# Patient Record
Sex: MALE | Race: WHITE | NOT HISPANIC OR LATINO | Employment: OTHER | RURAL
[De-identification: names, ages, dates, MRNs, and addresses within clinical notes are randomized per-mention and may not be internally consistent; named-entity substitution may affect disease eponyms.]

---

## 2021-08-24 ENCOUNTER — TRANSCRIBE ORDERS (OUTPATIENT)
Dept: PHYSICAL THERAPY | Facility: CLINIC | Age: 81
End: 2021-08-24

## 2021-08-24 DIAGNOSIS — Z96.651 PRESENCE OF RIGHT ARTIFICIAL KNEE JOINT: ICD-10-CM

## 2021-08-24 DIAGNOSIS — M17.11 UNILATERAL PRIMARY OSTEOARTHRITIS, RIGHT KNEE: Primary | ICD-10-CM

## 2021-08-26 ENCOUNTER — TREATMENT (OUTPATIENT)
Dept: PHYSICAL THERAPY | Facility: CLINIC | Age: 81
End: 2021-08-26

## 2021-08-26 DIAGNOSIS — M25.561 CHRONIC PAIN OF RIGHT KNEE: ICD-10-CM

## 2021-08-26 DIAGNOSIS — Z96.651 STATUS POST TOTAL RIGHT KNEE REPLACEMENT: Primary | ICD-10-CM

## 2021-08-26 DIAGNOSIS — G89.29 CHRONIC PAIN OF RIGHT KNEE: ICD-10-CM

## 2021-08-26 PROCEDURE — 97162 PT EVAL MOD COMPLEX 30 MIN: CPT | Performed by: PHYSICAL THERAPIST

## 2021-08-26 PROCEDURE — 97110 THERAPEUTIC EXERCISES: CPT | Performed by: PHYSICAL THERAPIST

## 2021-08-26 NOTE — PROGRESS NOTES
Physical Therapy Initial Evaluation and Plan of Care      Patient: Munir Lemons   : 1940  Diagnosis/ICD-10 Code:  Status post total right knee replacement [Z96.651]  Referring practitioner: Shaw Trivedi *  Date of Initial Visit: 2021  Today's Date: 2021  Patient seen for 1 sessions    Recertification: 2021   Next MD appt: 2022.    PACEMAKER         Subjective Questionnaire: LEFS: 68/80      Subjective Evaluation    History of Present Illness  Date of surgery: 2021    Subjective comment: Patient reports he had his R TKA replaced in may and he reports he decided to go to a different PT place than when he had his L knee replaced. He reports that  his R knee is still not like it should be. he reprots he wished he had gone here too.   Patient Occupation: Retired. Pain  Current pain rating: 3  At best pain ratin  At worst pain ratin  Location: R knee  Quality: tight  Relieving factors: change in position  Aggravating factors: squatting, prolonged positioning, repetitive movement, movement, standing and sleeping    Social Support  Lives in: one-story house (3 steps in to the home)  Lives with: spouse    Diagnostic Tests  X-ray: normal    Treatments  Previous treatment: physical therapy  Patient Goals  Patient goals for therapy: decreased pain and increased motion             Objective          Static Posture     Knee   Knee (Right): Flexed.     Palpation     Additional Palpation Details  R IT band distally tender and tight.    Tenderness     Right Knee   Tenderness in the ITB.     Neurological Testing     Sensation     Knee   Left Knee   Intact: light touch    Right Knee   Intact: light touch     Active Range of Motion   Left Knee   Flexion: 120 degrees   Extension: 0 degrees     Right Knee   Flexion: 112 degrees   Extension: 3 degrees     Patellar Mobility   Left Knee Patellar tendons within functional limits include the medial, lateral, superior and inferior.      Right Knee Patellar tendons within functional limits include the medial and lateral. Hypomobile in the superior and inferior patellar tendon(s).     Patellar Static Positioning   Left Knee: WFL  Right Knee: WFL    Strength/Myotome Testing     Left Knee   Normal strength  Flexion: 5  Extension: 5  Quadriceps contraction: good    Right Knee   Flexion: 4+  Extension: 4+  Quadriceps contraction: good    Ambulation   Weight-Bearing Status   Weight-Bearing Status (Left): weight-bearing as tolerated   Weight-Bearing Status (Right): weight-bearing as tolerated    Assistive device used: none    Ambulation: Level Surfaces   Ambulation without assistive device: independent    Ambulation: Stairs     Additional Stairs Ambulation Details  Not tested.    Observational Gait   Gait: antalgic and asymmetric   Decreased right step length.   Left foot contact pattern: heel to toe  Right foot contact pattern: foot flat  Left arm swing: within functional limits  Right arm swing: within functional limits  Base of support: normal    Quality of Movement During Gait     Knee    Knee (Right): Positive increased flexion during stance and stiff knee.           Assessment & Plan     Assessment  Impairments: abnormal gait, abnormal or restricted ROM, activity intolerance, impaired balance, impaired physical strength, lacks appropriate home exercise program, pain with function and weight-bearing intolerance  Assessment details: Patient is 3 months post op from R TKA. He had PT for ~2 months at another location in [facility] and is still lacking ROM and some strength. Also has altered gait due to his lack of AROM. Stiffness in patella also. Patient did well with all HEP exercises today. Patient was given written copies of HEP.  Prognosis: good  Prognosis details: Barriers to Rehab: The chronicity of this issue.    Safety Issues: Pacemaker    Functional Limitations: walking, sitting, standing and stooping  Goals  Plan Goals: Short Term Goals:  1)  Patient I with HEP and have additoins/changes by next recertification.    2) AROM R knee flexion >= 117°.    3) AROM R knee extension 0°.    4) Patient able to ambulate with good heel to toe gait cycle >= 300', non-antalgicaly, no increase in pain with terminal knee extension.    5) Patient able to perform sit to/from stand with 1 UE A = WB B LE x20, no increase in pain.      Long Term Goals:  1) AROm of the R knee 0°->= 120°.    2) B LE 5/5.    3) Patient able to ascend/descend 3 steps reciprocally with 1 hand rail assist, no increase in pain x10 reps.    4) Patient able to ambulate with no assistive device non-antalgicaly >= 1/4 mile.    5) Patient to be I with final HEP.          Plan  Therapy options: will be seen for skilled physical therapy services  Planned modality interventions: cryotherapy  Planned therapy interventions: ADL retraining, balance/weight-bearing training, body mechanics training, flexibility, functional ROM exercises, gait training, home exercise program, IADL retraining, joint mobilization, manual therapy, neuromuscular re-education, soft tissue mobilization, strengthening, stretching, therapeutic activities, transfer training and spinal/joint mobilization  Duration in visits: 8  Treatment plan discussed with: patient  Plan details: Progress overall strength, ROM, gait, function, and mobility.      Other therapeutic activities and/or exercises will be prescribed depending on the patients progress or lack there of.     Visit Diagnoses:    ICD-10-CM ICD-9-CM   1. Status post total right knee replacement  Z96.651 V43.65   2. Chronic pain of right knee  M25.561 719.46    G89.29 338.29       Timed:  Manual Therapy:         mins  04988;  Therapeutic Exercise:    26     mins  04558;      Neuromuscular Fausto:        mins  85116;    Therapeutic Activity:          mins  79820;     Gait Training:           mins  07467;     Ultrasound:          mins  84895;    Paraffin:        mins  97111;  Electrical  Stimulation:         mins  78775 ( );    Untimed:  Electrical Stimulation:         mins  47149 ( );  Mechanical Traction:         mins  60776;     Timed Treatment:   26   mins   Total Treatment:     46   mins    PT SIGNATURE: Di Neal PT DPT, CSCS   DATE TREATMENT INITIATED: 8/26/2021    Initial Certification  Certification Period: 11/24/2021  I certify that the therapy services are furnished while this patient is under my care.  The services outlined above are required by this patient, and will be reviewed every 90 days.     PHYSICIAN: Shaw Damian MD      DATE:     Please sign and return via fax to  .. Thank you, [facility].        This document has been electronically signed by Di Neal PT DPT, [facility] on August 26, 2021 13:38 CDT

## 2021-08-31 ENCOUNTER — TREATMENT (OUTPATIENT)
Dept: PHYSICAL THERAPY | Facility: CLINIC | Age: 81
End: 2021-08-31

## 2021-08-31 DIAGNOSIS — Z96.651 STATUS POST TOTAL RIGHT KNEE REPLACEMENT: Primary | ICD-10-CM

## 2021-08-31 DIAGNOSIS — G89.29 CHRONIC PAIN OF RIGHT KNEE: ICD-10-CM

## 2021-08-31 DIAGNOSIS — M25.561 CHRONIC PAIN OF RIGHT KNEE: ICD-10-CM

## 2021-08-31 PROCEDURE — 97140 MANUAL THERAPY 1/> REGIONS: CPT | Performed by: PHYSICAL THERAPIST

## 2021-08-31 PROCEDURE — 97110 THERAPEUTIC EXERCISES: CPT | Performed by: PHYSICAL THERAPIST

## 2021-08-31 NOTE — PROGRESS NOTES
Physical Therapy Daily Progress Note    Patient: Munir Lemons   : 1940  Diagnosis/ICD-10 Code:     Diagnosis Plan   1. Status post total right knee replacement     2. Chronic pain of right knee       Referring practitioner: Shaw Trivedi *  Date of Initial Visit: Type: THERAPY  Noted: 2021  Today's Date: 2021  Patient seen for 2 sessions      PT Recheck Due: 2021  PT MD Visit:     PACEMAKER       Munir Lemons      Subjective Evaluation    History of Present Illness    Subjective comment: states that he is just stiff feeling. gets stiff when he rides the tractor or like today standing on a step stool cleaning out cabinets. Pain  Current pain ratin             Objective          Active Range of Motion     Right Knee   Flexion: 127 degrees   Extension: 5 degrees       See Exercise, Manual, and Modality Logs for complete treatment.       Assessment & Plan     Assessment  Assessment details: Patient tends to long sit in a position of comfort with bilateral LE's slightly bent and rolled out. Patient is educated in avoiding position of comfort especially with surgical side and to make sure that he is sitting in a position of Knee upright and working on stretching into full extension. Patient verbalizes understanding appropriate positioning after education. Patient has significant increase in AROM knee flexion today    Goals  Plan Goals: Short Term Goals:  1) Patient I with HEP and have additoins/changes by next recertification. (met)    2) AROM R knee flexion >= 117°. (met)    3) AROM R knee extension 0°.     4) Patient able to ambulate with good heel to toe gait cycle >= 300', non-antalgicaly, no increase in pain with terminal knee extension.    5) Patient able to perform sit to/from stand with 1 UE A = WB B LE x20, no increase in pain.      Long Term Goals:  1) AROm of the R knee 0°->= 120°.    2) B LE 5/5.    3) Patient able to ascend/descend 3 steps  reciprocally with 1 hand rail assist, no increase in pain x10 reps.    4) Patient able to ambulate with no assistive device non-antalgicaly >= 1/4 mile.    5) Patient to be I with final HEP.    Plan  Plan details: Retro walking in [place]      Progress per Plan of Care and Progress strengthening /stabilization /functional activity            Timed:  Manual Therapy:    10     mins  97686;  Therapeutic Exercise:     50    mins  61632;   Aquatic Therex :        mins  30830    Neuromuscular Fausto:        mins  30078;    Therapeutic Activity:          mins  47718;     Gait Training:           mins  13442;     Ultrasound:          mins  26484;    Electrical Stimulation:         mins  24771 ( );    Untimed:  Electrical Stimulation:         mins  13409 ( );  Mechanical Traction:         mins  85568;   Paraffin:         mins  65140;  Orthotic fit/train:         mins  87859  Iontophoresis:          mins  48885    Timed Treatment:   60   mins   Total Treatment:     60   mins  Lina Rey PTA  Physical Therapist Assistant      This document has been electronically signed by Lina Rey PTA on August 31, 2021 09:52 CDT

## 2021-09-02 ENCOUNTER — HOSPITAL ENCOUNTER (OUTPATIENT)
Dept: PHYSICAL THERAPY | Facility: HOSPITAL | Age: 81
Setting detail: THERAPIES SERIES
Discharge: HOME | End: 2021-09-02

## 2021-09-02 DIAGNOSIS — G89.29 CHRONIC PAIN OF RIGHT KNEE: ICD-10-CM

## 2021-09-02 DIAGNOSIS — M25.561 CHRONIC PAIN OF RIGHT KNEE: ICD-10-CM

## 2021-09-02 DIAGNOSIS — Z96.651 STATUS POST TOTAL RIGHT KNEE REPLACEMENT: Primary | ICD-10-CM

## 2021-09-02 PROCEDURE — 97110 THERAPEUTIC EXERCISES: CPT

## 2021-09-02 PROCEDURE — 97140 MANUAL THERAPY 1/> REGIONS: CPT

## 2021-09-02 NOTE — THERAPY TREATMENT NOTE
Outpatient Physical Therapy Ortho Treatment Note  [facility]     Patient Name: Munir Lemons  : 1940  MRN: [id number]  Today's Date: 2021     This is a transfer from [facility] to [facility].      Visit Date: 2021     Attendance: 3/3  Does not rate % Improvement  MD Visit: TBD  Recheck Date: 2021    Therapy Diagnosis: S/P R TKA      Visit Dx:    ICD-10-CM ICD-9-CM   1. Status post total right knee replacement  Z96.651 V43.65   2. Chronic pain of right knee  M25.561 719.46    G89.29 338.29       There is no problem list on file for this patient.       Past Medical History:   Diagnosis Date   • Arthritis    • Cataract     Removed in    • High blood pressure    • History of coronary artery stent placement    • Malfunction of cardiac pacemaker battery 2021   • Pacemaker    • Stroke (CMS/[facility])     TIA        No past surgical history on file.    PT Ortho     Row Name 21 1100       Subjective Comments    Subjective Comments  states that he didn't hardly sleep last night. reports that he has some tightness today.   -       Precautions and Contraindications    Precautions/Limitations  (S) pacemaker  -       Subjective Pain    Able to rate subjective pain?  yes  -    Pre-Treatment Pain Level  6  -       Right Lower Ext    Rt Knee Extension/Flexion AROM  2-122°  -    Rt Knee Extension/Flexion PROM  Knee extension is 0° passively  -      User Key  (r) = Recorded By, (t) = Taken By, (c) = Cosigned By    Initials Name Provider Type    Lina Melendrez PTA Physical Therapy Assistant                      PT Assessment/Plan     Row Name 21 1100          PT Assessment    Assessment Comments  patient is able to achieve PROM of full knee extension. is 2° from full knee extension actively. gives good effort. patient reports increased pain today.   -        PT Plan    PT Frequency  2x/week  -     PT Plan Comments  continue to progress ROM for full knee  extension  -       User Key  (r) = Recorded By, (t) = Taken By, (c) = Cosigned By    Initials Name Provider Type     Lina Rey PTA Physical Therapy Assistant            OP Exercises     Row Name 09/02/21 1100             Precautions    Existing Precautions/Restrictions  (S) pacemaker  -         Subjective Comments    Subjective Comments  states that he didn't hardly sleep last night. reports that he has some tightness today.   -         Subjective Pain    Able to rate subjective pain?  yes  -      Pre-Treatment Pain Level  6  -      Post-Treatment Pain Level  7  -         Exercise 1    Exercise Name 1  Pro II LE's for ROM, endurance, strength  -      Time 1  10 minutes  -      Additional Comments  L 5.0  -         Exercise 2    Exercise Name 2  B St. HS S  -      Reps 2  2  -      Time 2  30 sec hold  -         Exercise 3    Exercise Name 3  R Lunge S   -MH      Reps 3  10  -MH      Time 3  10 sec hold  -         Exercise 4    Exercise Name 4  B Incline Calf S  -      Reps 4  2  -MH      Time 4  30 sec hold  -         Exercise 5    Exercise Name 5  Step Ups Fwd  -      Reps 5  20  -MH      Additional Comments  6 inch step  -MH         Exercise 6    Exercise Name 6  Step Ups Lat  -MH      Reps 6  20  -MH      Additional Comments  6 inch step  -MH         Exercise 7    Exercise Name 7  Ecc Step Downs  -      Reps 7  20  -MH      Additional Comments  5 inch step  -MH         Exercise 8    Exercise Name 8  Prone Hang  -      Time 8  5 minutes  -      Additional Comments  2# cuff weight  -        User Key  (r) = Recorded By, (t) = Taken By, (c) = Cosigned By    Initials Name Provider Type     Lina Rey PTA Physical Therapy Assistant                      Manual Rx (last 36 hours)      Manual Treatments     Row Name 09/02/21 1300             Manual Rx 1    Manual Rx 1 Location  R Knee  -      Manual Rx 1 Type  Heel Prop with Patella mobes inf/sup  -      Manual Rx  1 Grade  5 minutes  -         Manual Rx 2    Manual Rx 2 Location  R knee  -      Manual Rx 2 Type  Manual Overpressure with STM to hamstrings  -      Manual Rx 2 Grade  5 minutes  -        User Key  (r) = Recorded By, (t) = Taken By, (c) = Cosigned By    Initials Name Provider Type    Lina Melendrez PTA Physical Therapy Assistant          PT OP Goals     Row Name 09/02/21 1100          PT Short Term Goals    STG 1  Patient I with HEP and have additoins/changes by next recertification.  -     STG 1 Progress  Met  -     STG 2  AROM R knee flexion >= 117°  -     STG 2 Progress  Met  -     STG 3  AROM R knee extension 0°.   -     STG 4  Patient able to ambulate with good heel to toe gait cycle >= 300', non-antalgicaly, no increase in pain with terminal knee extension.  -     STG 5  Patient able to perform sit to/from stand with 1 UE A = WB B LE x20, no increase in pain.  -        Long Term Goals    LTG 1  AROm of the R knee 0°->= 120°.  -     LTG 2  B LE 5/5.  -     LTG 3  Patient able to ascend/descend 3 steps reciprocally with 1 hand rail assist, no increase in pain x10 reps.  -     LTG 4  Patient able to ambulate with no assistive device non-antalgicaly >= 1/4 mile.  -     LTG 5  Patient to be I with final HEP.  -        Time Calculation    PT Goal Re-Cert Due Date  09/16/21  -       User Key  (r) = Recorded By, (t) = Taken By, (c) = Cosigned By    Initials Name Provider Type     Lina Rey PTA Physical Therapy Assistant          Therapy Education  Given: HEP  Program: Reinforced  How Provided: Verbal, Demonstration  Provided to: Patient  Level of Understanding: Verbalized, Demonstrated              Time Calculation:   Start Time: 1133  Stop Time: 1217  Time Calculation (min): 44 min  Total Timed Code Minutes- PT: 44 minute(s)  Therapy Charges for Today     Code Description Service Date Service Provider Modifiers Qty    19446796735 HC PT THER PROC EA 15 MIN 9/2/2021  Lina Rey PTA GP 2    30345806263 HC PT THER SUPP EA 15 MIN 9/2/2021 Lina Rey PTA GP 1    28504945179 HC PT MANUAL THERAPY EA 15 MIN 9/2/2021 Lina Rey PTA GP 1                    Lina Rey PTA  9/2/2021            This document has been electronically signed by Lina Rey PTA on September 2, 2021 13:13 CDT

## 2021-09-07 ENCOUNTER — HOSPITAL ENCOUNTER (OUTPATIENT)
Dept: PHYSICAL THERAPY | Facility: HOSPITAL | Age: 81
Setting detail: THERAPIES SERIES
Discharge: HOME | End: 2021-09-07

## 2021-09-07 DIAGNOSIS — M25.561 CHRONIC PAIN OF RIGHT KNEE: ICD-10-CM

## 2021-09-07 DIAGNOSIS — G89.29 CHRONIC PAIN OF RIGHT KNEE: ICD-10-CM

## 2021-09-07 DIAGNOSIS — Z96.651 STATUS POST TOTAL RIGHT KNEE REPLACEMENT: Primary | ICD-10-CM

## 2021-09-07 PROCEDURE — 97110 THERAPEUTIC EXERCISES: CPT

## 2021-09-07 NOTE — THERAPY TREATMENT NOTE
Outpatient Physical Therapy Neuro Treatment Note  [facility]     Patient Name: Munir Lemons  : 1940  MRN: [id number]  Today's Date: 2021      Visit Date: 2021     Attendance:   Does not rate% Improvement  MD Visit: TBD  Recheck Date: 2021    Therapy Diagnosis: S/P R TKA    Visit Dx:    ICD-10-CM ICD-9-CM   1. Status post total right knee replacement  Z96.651 V43.65   2. Chronic pain of right knee  M25.561 719.46    G89.29 338.29       There is no problem list on file for this patient.        PT Ortho     Row Name 21       Subjective Comments    Subjective Comments  states that he has no pain right now. yesterday he moved a generator and serviced it. also rode his [employer] tractor.   -       Precautions and Contraindications    Precautions/Limitations  (S) pacemaker  -       Subjective Pain    Able to rate subjective pain?  yes  -    Pre-Treatment Pain Level  0  -    Post-Treatment Pain Level  0  -       Right Lower Ext    Rt Knee Extension/Flexion AROM  2° from full extension  -      User Key  (r) = Recorded By, (t) = Taken By, (c) = Cosigned By    Initials Name Provider Type     Lina Rey, LANCE Physical Therapy Assistant                    PT Assessment/Plan     Row Name 21          PT Assessment    Assessment Comments  patient continues to be tight in hamstrings in long sitting position and can only actively achieve 2° from full knee extension today. gives good efffort.   -        PT Plan    PT Frequency  2x/week  -     PT Plan Comments  next visit stool scoots  -       User Key  (r) = Recorded By, (t) = Taken By, (c) = Cosigned By    Initials Name Provider Type    Lina Melendrez PTA Physical Therapy Assistant             OP Exercises     Row Name 21             Precautions    Existing Precautions/Restrictions  (S) pacemaker  -         Subjective Comments    Subjective Comments  states that he has no pain  right now. yesterday he moved a generator and serviced it. also rode his [employer] tractor.   -         Subjective Pain    Able to rate subjective pain?  yes  -      Pre-Treatment Pain Level  0  -      Post-Treatment Pain Level  0  -         Exercise 1    Exercise Name 1  Pro II LE's for ROM, endurance, strength  -      Time 1  10 minutes  -      Additional Comments  L 6.0  -         Exercise 2    Exercise Name 2  B Incline Calf S  -      Reps 2  2  -      Time 2  30 sec hold  -         Exercise 3    Exercise Name 3  B St. HS S  -      Reps 3  2  -      Time 3  30 sec hold  -         Exercise 4    Exercise Name 4  R St. Lunge S  -      Reps 4  10  -      Time 4  10 sec hold  -         Exercise 5    Exercise Name 5  Step Up Fwd  -      Reps 5  20  -      Additional Comments  6 inch step  -         Exercise 6    Exercise Name 6  Step Ups Lat  -      Reps 6  20  -      Additional Comments  6 inch step  -         Exercise 7    Exercise Name 7  Ecc Step Downs  -      Reps 7  20  -      Additional Comments  6 inch step  -         Exercise 8    Exercise Name 8  Shuttle 2L  -      Time 8  5 minutes  -      Additional Comments  6 cords  -         Exercise 9    Exercise Name 9  Shuttle 1L  -      Time 9  5 minutes  -      Additional Comments  4 cords  -         Exercise 10    Exercise Name 10  Prone Hang  -      Time 10  5 minutes  -      Additional Comments  3# cuff weight with heat to HS  -         Exercise 11    Exercise Name 11  Retro Walking in [place]  -      Time 11  4 minutes  -        User Key  (r) = Recorded By, (t) = Taken By, (c) = Cosigned By    Initials Name Provider Type     Lina Rey PTA Physical Therapy Assistant                     Manual Rx (last 36 hours)      Manual Treatments     Row Name 09/07/21 0900             Manual Rx 1    Manual Rx 1 Location  R Knee  -      Manual Rx 1 Type  STM to Hamstrings in prone  -      Manual  Rx 1 Grade  5 minutes  -        User Key  (r) = Recorded By, (t) = Taken By, (c) = Cosigned By    Initials Name Provider Type     Lina Rey PTA Physical Therapy Assistant          PT OP Goals     Row Name 09/07/21 0900          PT Short Term Goals    STG 1  Patient I with HEP and have additoins/changes by next recertification.  -     STG 1 Progress  Met  -     STG 2  AROM R knee flexion >= 117°  -     STG 2 Progress  Met  -     STG 3  AROM R knee extension 0°.   -     STG 4  Patient able to ambulate with good heel to toe gait cycle >= 300', non-antalgicaly, no increase in pain with terminal knee extension.  -     STG 5  Patient able to perform sit to/from stand with 1 UE A = WB B LE x20, no increase in pain.  -        Long Term Goals    LTG 1  AROm of the R knee 0°->= 120°.  -     LTG 2  B LE 5/5.  -     LTG 3  Patient able to ascend/descend 3 steps reciprocally with 1 hand rail assist, no increase in pain x10 reps.  -     LTG 4  Patient able to ambulate with no assistive device non-antalgicaly >= 1/4 mile.  -     LTG 5  Patient to be I with final HEP.  -        Time Calculation    PT Goal Re-Cert Due Date  09/16/21  -       User Key  (r) = Recorded By, (t) = Taken By, (c) = Cosigned By    Initials Name Provider Type     Lina Rey PTA Physical Therapy Assistant          Therapy Education  Given: HEP  Program: Reinforced  How Provided: Verbal, Demonstration  Provided to: Patient  Level of Understanding: Verbalized, Demonstrated              Time Calculation:   Start Time: 0917  Stop Time: 1017  Time Calculation (min): 60 min  Total Timed Code Minutes- PT: 60 minute(s)   Therapy Charges for Today     Code Description Service Date Service Provider Modifiers Qty    21051716373 HC PT THER PROC EA 15 MIN 9/7/2021 Lina Rey PTA GP 3    47724217318 HC PT THER SUPP EA 15 MIN 9/7/2021 Lina Rey PTA GP 1                    Lina Rey PTA  9/7/2021

## 2021-09-08 ENCOUNTER — APPOINTMENT (OUTPATIENT)
Dept: PHYSICAL THERAPY | Facility: HOSPITAL | Age: 81
End: 2021-09-08

## 2021-09-10 ENCOUNTER — HOSPITAL ENCOUNTER (OUTPATIENT)
Dept: PHYSICAL THERAPY | Facility: HOSPITAL | Age: 81
Setting detail: THERAPIES SERIES
Discharge: HOME | End: 2021-09-10

## 2021-09-10 DIAGNOSIS — G89.29 CHRONIC PAIN OF RIGHT KNEE: ICD-10-CM

## 2021-09-10 DIAGNOSIS — M25.561 CHRONIC PAIN OF RIGHT KNEE: ICD-10-CM

## 2021-09-10 DIAGNOSIS — Z96.651 STATUS POST TOTAL RIGHT KNEE REPLACEMENT: Primary | ICD-10-CM

## 2021-09-10 PROCEDURE — 97112 NEUROMUSCULAR REEDUCATION: CPT

## 2021-09-10 PROCEDURE — 97110 THERAPEUTIC EXERCISES: CPT

## 2021-09-10 NOTE — THERAPY TREATMENT NOTE
Outpatient Physical Therapy Ortho Treatment Note  [facility]     Patient Name: Munir Lemons  : 1940  MRN: [id number]  Today's Date: 9/10/2021     Attendance:   Does not rate% Improvement  MD Visit: TBD  Recheck Date: 2021     Therapy Diagnosis: S/P R TKA      Visit Date: 09/10/2021    Visit Dx:    ICD-10-CM ICD-9-CM   1. Status post total right knee replacement  Z96.651 V43.65   2. Chronic pain of right knee  M25.561 719.46    G89.29 338.29       There is no problem list on file for this patient.       Past Medical History:   Diagnosis Date   • Arthritis    • Cataract     Removed in    • High blood pressure    • History of coronary artery stent placement    • Malfunction of cardiac pacemaker battery 2021   • Pacemaker    • Stroke (CMS/[facility])     TIA        No past surgical history on file.    PT Ortho     Row Name 09/10/21 0900       Subjective Comments    Subjective Comments  doing well. having a little pain right now.   -       Precautions and Contraindications    Precautions/Limitations  (S) pacemaker  -       Subjective Pain    Able to rate subjective pain?  yes  -    Pre-Treatment Pain Level  2  -    Post-Treatment Pain Level  0  -       Right Lower Ext    Rt Knee Extension/Flexion AROM  2° from full extension  -      User Key  (r) = Recorded By, (t) = Taken By, (c) = Cosigned By    Initials Name Provider Type    Lina Melendrez, PTA Physical Therapy Assistant                      PT Assessment/Plan     Row Name 09/10/21 0900          PT Assessment    Assessment Comments  with quad sets, SAQ and SLR today focused on appropriate contraction vs compensation of other muscles. cues for visual and palpation when completing so that patiient knows that he is getting an appropriate contraction. patient has improved SLR technique at this time with no lag noted when completing. ambulates with terminal knee extension and good kinematics completing.   -         PT Plan    PT Frequency  2x/week  -     PT Plan Comments  stool scoots next visit. assess reciprocal stairs next visit  -       User Key  (r) = Recorded By, (t) = Taken By, (c) = Cosigned By    Initials Name Provider Type     Lina Rey PTA Physical Therapy Assistant            OP Exercises     Row Name 09/10/21 0900             Precautions    Existing Precautions/Restrictions  (S) pacemaker  -         Subjective Comments    Subjective Comments  doing well. having a little pain right now.   -         Subjective Pain    Able to rate subjective pain?  yes  -      Pre-Treatment Pain Level  2  -      Post-Treatment Pain Level  0  -         Exercise 1    Exercise Name 1  Pro II LE's for ROM, endurance, strength  -      Time 1  10 minutes  -      Additional Comments  L 6.0  -         Exercise 2    Exercise Name 2  B Incline Calf S  -      Reps 2  2  -      Time 2  30 sec hold  -         Exercise 3    Exercise Name 3  B St. HS S  -      Reps 3  2  -      Time 3  30 sec hold  -         Exercise 4    Exercise Name 4  R St. Lunge S  -      Reps 4  10  -      Time 4  10 sec hold  -         Exercise 5    Exercise Name 5  Prone Hang   -      Time 5  5 minutes  -      Additional Comments  3# weight with MHP at Hamstrings  -         Exercise 6    Exercise Name 6  Quad sets  -      Reps 6  20  -MH         Exercise 7    Exercise Name 7  SAQ  -      Reps 7  20  -MH         Exercise 8    Exercise Name 8  SLR Fwd Flexion  -      Reps 8  20  -      Time 8  5 sec hold  -        User Key  (r) = Recorded By, (t) = Taken By, (c) = Cosigned By    Initials Name Provider Type     Lina Rey PTA Physical Therapy Assistant                      Manual Rx (last 36 hours)      Manual Treatments     Row Name 09/10/21 0900             Manual Rx 1    Manual Rx 1 Location  R Knee  -      Manual Rx 1 Type  STM to Hamstrings in prone  -      Manual Rx 1 Grade  5 minutes  -         User Key  (r) = Recorded By, (t) = Taken By, (c) = Cosigned By    Initials Name Provider Type     Lina Rey PTA Physical Therapy Assistant          PT OP Goals     Row Name 09/10/21 0900          PT Short Term Goals    STG 1  Patient I with HEP and have additoins/changes by next recertification.  -     STG 1 Progress  Met  -     STG 2  AROM R knee flexion >= 117°  -     STG 2 Progress  Met  -     STG 3  AROM R knee extension 0°.   -     STG 4  Patient able to ambulate with good heel to toe gait cycle >= 300', non-antalgicaly, no increase in pain with terminal knee extension.  -     STG 4 Progress  Met  -     STG 5  Patient able to perform sit to/from stand with 1 UE A = WB B LE x20, no increase in pain.  -        Long Term Goals    LTG 1  AROm of the R knee 0°->= 120°.  -     LTG 2  B LE 5/5.  -     LTG 3  Patient able to ascend/descend 3 steps reciprocally with 1 hand rail assist, no increase in pain x10 reps.  -     LTG 4  Patient able to ambulate with no assistive device non-antalgicaly >= 1/4 mile.  -     LTG 5  Patient to be I with final HEP.  -        Time Calculation    PT Goal Re-Cert Due Date  09/16/21  -       User Key  (r) = Recorded By, (t) = Taken By, (c) = Cosigned By    Initials Name Provider Type     Lina Rey PTA Physical Therapy Assistant          Therapy Education  Given: HEP  Program: Reinforced  How Provided: Verbal, Demonstration  Provided to: Patient  Level of Understanding: Teach back education performed, Verbalized, Demonstrated              Time Calculation:   Start Time: 0918  Stop Time: 1012  Time Calculation (min): 54 min  Total Timed Code Minutes- PT: 54 minute(s)  Therapy Charges for Today     Code Description Service Date Service Provider Modifiers Qty    08045512937 HC PT NEUROMUSC RE EDUCATION EA 15 MIN 9/10/2021 Lina Rey PTA GP 1    03652937802 HC PT THER PROC EA 15 MIN 9/10/2021 Lina Rey PTA GP 3    18834606982 HC PT  THER SUPP EA 15 MIN 9/10/2021 Lina Rey PTA GP 1                    Lina Rey PTA  9/10/2021       This document has been electronically signed by Lina Rey PTA on September 10, 2021 10:18 CDT

## 2021-09-14 ENCOUNTER — HOSPITAL ENCOUNTER (OUTPATIENT)
Dept: PHYSICAL THERAPY | Facility: HOSPITAL | Age: 81
Setting detail: THERAPIES SERIES
Discharge: HOME | End: 2021-09-14

## 2021-09-14 DIAGNOSIS — G89.29 CHRONIC PAIN OF RIGHT KNEE: ICD-10-CM

## 2021-09-14 DIAGNOSIS — M25.561 CHRONIC PAIN OF RIGHT KNEE: ICD-10-CM

## 2021-09-14 DIAGNOSIS — Z96.651 STATUS POST TOTAL RIGHT KNEE REPLACEMENT: Primary | ICD-10-CM

## 2021-09-14 PROCEDURE — 97110 THERAPEUTIC EXERCISES: CPT

## 2021-09-14 PROCEDURE — 97140 MANUAL THERAPY 1/> REGIONS: CPT

## 2021-09-14 NOTE — THERAPY TREATMENT NOTE
Outpatient Physical Therapy Ortho Treatment Note  [facility]     Patient Name: Munir Lemons  : 1940  MRN: [id number]  Today's Date: 2021      Visit Date: 2021     Attendance:   Does not rate% Improvement  MD Visit: TBD  Recheck Date: 2021     Therapy Diagnosis: S/P R TKA       Visit Dx:    ICD-10-CM ICD-9-CM   1. Status post total right knee replacement  Z96.651 V43.65   2. Chronic pain of right knee  M25.561 719.46    G89.29 338.29       There is no problem list on file for this patient.       Past Medical History:   Diagnosis Date   • Arthritis    • Cataract     Removed in    • High blood pressure    • History of coronary artery stent placement    • Malfunction of cardiac pacemaker battery 2021   • Pacemaker    • Stroke (CMS/[facility])     TIA        No past surgical history on file.    PT Ortho     Row Name 21 1100       Precautions and Contraindications    Precautions/Limitations  (S) pacemaker  -       Subjective Pain    Able to rate subjective pain?  yes  -    Pre-Treatment Pain Level  2  -       Right Lower Ext    Rt Knee Extension/Flexion AROM  3° from full extension  -    Rt Knee Extension/Flexion PROM  1° from full extension  -      User Key  (r) = Recorded By, (t) = Taken By, (c) = Cosigned By    Initials Name Provider Type    Lina Melendrez PTA Physical Therapy Assistant                      PT Assessment/Plan     Row Name 21 1100          PT Assessment    Assessment Comments  patient did well with therex. still remains lacking full knee extension at this time. patient gives good effort and appears to be very compliant with HEP at this time.   -        PT Plan    PT Frequency  2x/week  -     PT Plan Comments  add reciprical stairs next visit to assess for goal. track walk next visit.   -       User Key  (r) = Recorded By, (t) = Taken By, (c) = Cosigned By    Initials Name Provider Type    Lina Melendrez PTA  Physical Therapy Assistant            OP Exercises     Row Name 09/14/21 1100             Precautions    Existing Precautions/Restrictions  (S) pacemaker  -         Subjective Comments    Subjective Comments  has been doing the prone hang at home. a little pain.   -         Subjective Pain    Able to rate subjective pain?  yes  -      Pre-Treatment Pain Level  2  -      Post-Treatment Pain Level  2  -         Exercise 1    Exercise Name 1  Pro II LE's for ROM, endurance, strength  -      Time 1  10 minutes  -      Additional Comments  L 6.0  -         Exercise 2    Exercise Name 2  Prone Hang  -      Additional Comments  MHP to HS; 3# cuff ewight  -         Exercise 3    Exercise Name 3  Alt Isometric Hamstring/Quad  -         Exercise 4    Exercise Name 4  Isometric Hamcurls  -         Exercise 5    Exercise Name 5  Prone TKE   -      Reps 5  20  -      Time 5  5 sec hold  -         Exercise 6    Exercise Name 6  Prone Hamcurls  -      Reps 6  20  -MH         Exercise 7    Exercise Name 7  Stool Scoots  -      Reps 7  3 laps  -      Additional Comments  carpet  -         Exercise 8    Exercise Name 8  Ecc Step Downs   -      Reps 8  20  -      Additional Comments  6 inch step   -        User Key  (r) = Recorded By, (t) = Taken By, (c) = Cosigned By    Initials Name Provider Type     Lina Rey PTA Physical Therapy Assistant                      Manual Rx (last 36 hours)      Manual Treatments     Row Name 09/14/21 1100             Manual Rx 1    Manual Rx 1 Location  R Knee  -      Manual Rx 1 Type  STM to Hamstrings in prone, Tib/Fem mobe, patella mobe  -      Manual Rx 1 Grade  10 minutes  -        User Key  (r) = Recorded By, (t) = Taken By, (c) = Cosigned By    Initials Name Provider Type     Lina Rey PTA Physical Therapy Assistant          PT OP Goals     Row Name 09/14/21 1100          PT Short Term Goals    STG 1  Patient I with HEP and have  additoins/changes by next recertification.  -     STG 1 Progress  Met  -     STG 2  AROM R knee flexion >= 117°  -     STG 2 Progress  Met  -     STG 3  AROM R knee extension 0°.   -     STG 4  Patient able to ambulate with good heel to toe gait cycle >= 300', non-antalgicaly, no increase in pain with terminal knee extension.  -     STG 4 Progress  Met  -     STG 5  Patient able to perform sit to/from stand with 1 UE A = WB B LE x20, no increase in pain.  -        Long Term Goals    LTG 1  AROm of the R knee 0°->= 120°.  -     LTG 2  B LE 5/5.  -     LTG 3  Patient able to ascend/descend 3 steps reciprocally with 1 hand rail assist, no increase in pain x10 reps.  -     LTG 4  Patient able to ambulate with no assistive device non-antalgicaly >= 1/4 mile.  -     LTG 5  Patient to be I with final HEP.  -        Time Calculation    PT Goal Re-Cert Due Date  09/16/21  -       User Key  (r) = Recorded By, (t) = Taken By, (c) = Cosigned By    Initials Name Provider Type     Lina Rey PTA Physical Therapy Assistant          Therapy Education  Education Details: retro walking  Given: HEP  Program: New, Reinforced  How Provided: Verbal, Demonstration  Provided to: Patient  Level of Understanding: Teach back education performed, Verbalized, Demonstrated              Time Calculation:   Start Time: 1130  Stop Time: 1213  Time Calculation (min): 43 min  Total Timed Code Minutes- PT: 43 minute(s)  Therapy Charges for Today     Code Description Service Date Service Provider Modifiers Qty    44197633139 HC PT THER PROC EA 15 MIN 9/14/2021 Lina Rey PTA GP 2    13687438883 HC PT MANUAL THERAPY EA 15 MIN 9/14/2021 Lina Rey PTA GP 1    12714522759 HC PT THER SUPP EA 15 MIN 9/14/2021 Lina Rey PTA GP 1                    Lina Rey PTA  9/14/2021       This document has been electronically signed by Lina Rey PTA on September 14, 2021 13:17 CDT

## 2021-09-16 ENCOUNTER — HOSPITAL ENCOUNTER (OUTPATIENT)
Dept: PHYSICAL THERAPY | Facility: HOSPITAL | Age: 81
Setting detail: THERAPIES SERIES
Discharge: HOME | End: 2021-09-16

## 2021-09-16 DIAGNOSIS — Z96.651 STATUS POST TOTAL RIGHT KNEE REPLACEMENT: Primary | ICD-10-CM

## 2021-09-16 DIAGNOSIS — M25.561 CHRONIC PAIN OF RIGHT KNEE: ICD-10-CM

## 2021-09-16 DIAGNOSIS — G89.29 CHRONIC PAIN OF RIGHT KNEE: ICD-10-CM

## 2021-09-16 PROCEDURE — 97530 THERAPEUTIC ACTIVITIES: CPT | Performed by: PHYSICAL THERAPIST

## 2021-09-16 PROCEDURE — 97110 THERAPEUTIC EXERCISES: CPT | Performed by: PHYSICAL THERAPIST

## 2021-09-16 NOTE — THERAPY PROGRESS REPORT/RE-CERT
Outpatient Physical Therapy Ortho Progress Note  [facility]     Patient Name: Munir Lemons  : 1940  MRN: [id number]  Today's Date: 2021      Visit Date: 2021    Attendance:   90% Improvement  Next MD appt: JENELLE.  Recertification: 10/07/202, if needed    Therapy Diagnosis: s/p R TKA         Past Medical History:   Diagnosis Date   • Arthritis    • Cataract     Removed in    • High blood pressure    • History of coronary artery stent placement    • Malfunction of cardiac pacemaker battery 2021   • Pacemaker    • Stroke (CMS/HCC) 2013    TIA          Visit Dx:     ICD-10-CM ICD-9-CM   1. Status post total right knee replacement  Z96.651 V43.65   2. Chronic pain of right knee  M25.561 719.46    G89.29 338.29             PT Ortho     Row Name 21 0800       Subjective Comments    Subjective Comments  Patient reports his only big issue is getitng out o the car after riding 1.5-2 hours.  -AJ       Precautions and Contraindications    Precautions/Limitations  (S) pacemaker  -AJ       Subjective Pain    Able to rate subjective pain?  yes  -AJ    Pre-Treatment Pain Level  2  -AJ    Post-Treatment Pain Level  2  -AJ       Posture/Observations    Observations  Edema lack of TKE on the R.  -AJ       Right Lower Ext    Rt Knee Extension/Flexion AROM  1°-121°  -AJ    Rt Knee Extension/Flexion PROM  1° hyperextension  -AJ       MMT (Manual Muscle Testing)    General MMT Comments  B LE 5/5.  -AJ       Sensation    Sensation WNL?  WNL  -AJ       Transfers    Comment (Transfers)  I with all transfers, = WB sit to/from stand with no UE A  -AJ       Gait/Stairs (Locomotion)    [place] Level (Stairs)  independent  -AJ    Handrail Location (Stairs)  right side (ascending);right side (descending)  -AJ    Number of Steps (Stairs)  30  -AJ    Ascending Technique (Stairs)  step-over-step  -AJ    Descending Technique (Stairs)  step-over-step  -AJ    Comment (Gait/Stairs)   [place] stiff leg with gait at times, but improved.  -KATIE      User Key  (r) = Recorded By, (t) = Taken By, (c) = Cosigned By    Initials Name Provider Type    Di Saenz, PT DPT Physical Therapist                      Therapy Education  Given: HEP, Symptoms/condition management (POC)  Program: Reinforced  How Provided: Verbal  Provided to: Patient  Level of Understanding: Verbalized     PT OP Goals     Row Name 09/16/21 0800          PT Short Term Goals    STG 1  Patient I with HEP and have additoins/changes by next recertification.  -     STG 1 Progress  Met  -     STG 2  AROM R knee flexion >= 117°  -     STG 2 Progress  Met  -     STG 3  AROM R knee extension 0°.   -     STG 3 Progress  Ongoing;Progressing  -     STG 4  Patient able to ambulate with good heel to toe gait cycle >= 300', non-antalgicaly, no increase in pain with terminal knee extension.  -     STG 4 Progress  Met  -     STG 5  Patient able to perform sit to/from stand with 1 UE A = WB B LE x20, no increase in pain.  -     STG 5 Progress  Met  -        Long Term Goals    LTG 1  AROm of the R knee 0°->= 120°.  -     LTG 1 Progress  Partially Met;Ongoing  -     LTG 2  B LE 5/5.  -     LTG 2 Progress  Met  -     LTG 3  Patient able to ascend/descend 3 steps reciprocally with 1 hand rail assist, no increase in pain x10 reps.  -     LTG 3 Progress  Met  -     LTG 4  Patient able to ambulate with no assistive device non-antalgicaly >= 1/4 mile.  -     LTG 4 Progress  Ongoing  -     LTG 5  Patient to be I with final HEP.  -     LTG 5 Progress  Ongoing  -        Time Calculation    PT Goal Re-Cert Due Date  -- N/A  -KATIE       User Key  (r) = Recorded By, (t) = Taken By, (c) = Cosigned By    Initials Name Provider Type    Di Saenz, PT DPT Physical Therapist         Barriers to Rehab: The chronicity of this issue.    Safety Issues: Pacemaker    PT Assessment/Plan     Row Name 09/16/21  0800          PT Assessment    Functional Limitations  Impaired gait;Performance in leisure activities  -     Impairments  Gait;Impaired muscle length;Range of motion  -     Assessment Comments  Patient continues to make improvesments in ROM daily. Now able to achieve 1° hyperextension of PROM. Patient now has normal strength too. patient stil requires some cueing for not walking stiff legged with gait. No issues with reciprocal stairs.  -AJ     Rehab Potential  Good  -AJ     Patient/caregiver participated in establishment of treatment plan and goals  Yes  -AJ     Patient would benefit from skilled therapy intervention  Yes  -AJ        PT Plan    PT Frequency  2x/week  -AJ     Predicted Duration of Therapy Intervention (PT)  2-6 more visits  -     PT Plan Comments  Add track walk next session  -       User Key  (r) = Recorded By, (t) = Taken By, (c) = Cosigned By    Initials Name Provider Type    Di Saenz, PT DPT Physical Therapist       Other therapeutic activities and/or exercises will be prescribed depending on the patient's progress or lack thereof.        OP Exercises     Row Name 09/16/21 0800             Precautions    Existing Precautions/Restrictions  (S) pacemaker  -AJ         Subjective Comments    Subjective Comments  Patient reports his only big issue is getitng out o the car after riding 1.5-2 hours.  -AJ         Subjective Pain    Able to rate subjective pain?  yes  -AJ      Pre-Treatment Pain Level  2  -AJ      Post-Treatment Pain Level  2  -         Exercise 1    Exercise Name 1  Pro II LE's for ROM, endurance, strength  -AJ      Time 1  10 minutes  -AJ      Additional Comments  L 6.5  -AJ         Exercise 2    Exercise Name 2  Prone Hang  -      Cueing 2  Verbal  -      Time 2  5 minutes  -AJ      Additional Comments  MHP to HS and 3# ankle weight  -AJ         Exercise 3    Exercise Name 3  measurements  -         Exercise 4    Exercise Name 4  3 reciprocal steps   -AJ      Cueing 4  Verbal;Demo  -AJ      Reps 4  10  -AJ      Additional Comments  1 HRA  -AJ         Exercise 5    Exercise Name 5  sit to/from stand  -AJ      Cueing 5  Verbal  -AJ      Reps 5  20  -AJ      Additional Comments  no UE A  -AJ         Exercise 6    Exercise Name 6  CC resisted walk F/R  -AJ      Reps 6  5 each  -AJ      Additional Comments  3 plates  -AJ         Exercise 7    Exercise Name 7  Heel walking/Toe walking alternating in ll bars  -AJ      Reps 7  10 laps total  -         Exercise 8    Exercise Name 8  Shuttle: 2L press  -AJ      Time 8  5 minutes  -AJ      Additional Comments  7 cords  -AJ        User Key  (r) = Recorded By, (t) = Taken By, (c) = Cosigned By    Initials Name Provider Type    Di Saenz, PT DPT Physical Therapist                        Outcome Measure Options: Lower Extremity Functional Scale (LEFS)  Lower Extremity Functional Index  Any of your usual work, housework or school activities: No difficulty  Your usual hobbies, recreational or sporting activities: A little bit of difficulty  Getting into or out of the bath: A little bit of difficulty  Walking between rooms: No difficulty  Putting on your shoes or socks: No difficulty  Squatting: A little bit of difficulty  Lifting an object, like a bag of groceries from the floor: No difficulty  Performing light activities around your home: No difficulty  Performing heavy activities around your home: A little bit of difficulty  Getting into or out of a car: A little bit of difficulty  Walking 2 blocks: No difficulty  Walking a mile: A little bit of difficulty  Going up or down 10 stairs (about 1 flight of stairs): A little bit of difficulty  Standing for 1 hour: A little bit of difficulty  Sitting for 1 hour: A little bit of difficulty  Running on even ground: Moderate difficulty  Running on uneven ground: Moderate difficulty  Making sharp turns while running fast: Moderate difficulty  Hopping: A little bit of  difficulty  Rolling over in bed: No difficulty  Total: 64      Time Calculation:     Start Time: 0830  Stop Time: 0918  Time Calculation (min): 48 min  Total Timed Code Minutes- PT: 48 minute(s)     Therapy Charges for Today     Code Description Service Date Service Provider Modifiers Qty    26594207911  PT THERAPEUTIC ACT EA 15 MIN 9/16/2021 Di Neal, PT DPT GP 1    [id number]  PT THER SUPP EA 15 MIN 9/16/2021 Di Neal, PT DPT GP 1    75213131662  PT THER PROC EA 15 MIN 9/16/2021 Di Neal, PT DPT GP 2          PT G-Codes  Outcome Measure Options: Lower Extremity Functional Scale (LEFS)  Total: 64       This document has been electronically signed by Di Neal PT DPT, [facility] on September 16, 2021 10:37 CDT

## 2021-09-21 ENCOUNTER — HOSPITAL ENCOUNTER (OUTPATIENT)
Dept: PHYSICAL THERAPY | Facility: HOSPITAL | Age: 81
Setting detail: THERAPIES SERIES
Discharge: HOME | End: 2021-09-21

## 2021-09-21 DIAGNOSIS — G89.29 CHRONIC PAIN OF RIGHT KNEE: ICD-10-CM

## 2021-09-21 DIAGNOSIS — M25.561 CHRONIC PAIN OF RIGHT KNEE: ICD-10-CM

## 2021-09-21 DIAGNOSIS — Z96.651 STATUS POST TOTAL RIGHT KNEE REPLACEMENT: Primary | ICD-10-CM

## 2021-09-21 PROCEDURE — 97110 THERAPEUTIC EXERCISES: CPT

## 2021-09-21 NOTE — THERAPY TREATMENT NOTE
Outpatient Physical Therapy Ortho Treatment Note  [facility]     Patient Name: Munir Lemons  : 1940  MRN: [id number]  Today's Date: 2021      Visit Date: 2021     Attendance:   90% Improvement  MD Visit: TBD  Recheck Date: 10-    Therapy Diagnosis: S/P R TKA    Visit Dx:    ICD-10-CM ICD-9-CM   1. Status post total right knee replacement  Z96.651 V43.65   2. Chronic pain of right knee  M25.561 719.46    G89.29 338.29       There is no problem list on file for this patient.       Past Medical History:   Diagnosis Date   • Arthritis    • Cataract     Removed in    • High blood pressure    • History of coronary artery stent placement    • Malfunction of cardiac pacemaker battery 2021   • Pacemaker    • Stroke (CMS/[facility])     TIA        No past surgical history on file.    PT Ortho     Row Name 21 08       Subjective Comments    Subjective Comments  has already done the prone hang 2x today  -       Precautions and Contraindications    Precautions/Limitations  (S) pacemaker  -       Subjective Pain    Able to rate subjective pain?  yes  -      User Key  (r) = Recorded By, (t) = Taken By, (c) = Cosigned By    Initials Name Provider Type     Lina Rey PTA Physical Therapy Assistant                      PT Assessment/Plan     Row Name 21          PT Assessment    Assessment Comments  with effort patient is able to achieve AROM for full knee extension today. gives good effort and does well with all therex. patient able to ambulate 1/4 mile with non antalgic gait.   -        PT Plan    PT Frequency  2x/week  -     PT Plan Comments  next visit continue with    -       User Key  (r) = Recorded By, (t) = Taken By, (c) = Cosigned By    Initials Name Provider Type    Lina Melendrez PTA Physical Therapy Assistant            OP Exercises     Row Name 21 08             Precautions    Existing Precautions/Restrictions   (S) pacemaker  -         Subjective Comments    Subjective Comments  has already done the prone hang 2x today  -         Subjective Pain    Able to rate subjective pain?  yes  -      Pre-Treatment Pain Level  2  -      Post-Treatment Pain Level  0  -         Exercise 1    Exercise Name 1  Pro II LE's for ROM, endurance, strength  -      Time 1  10 minutes  -      Additional Comments  L 7.0  -         Exercise 2    Exercise Name 2  B St. HS S  -      Reps 2  2  -      Time 2  30 sec hold  -         Exercise 3    Exercise Name 3  B St. Lunge S  -      Reps 3  10  -      Time 3  10 sec hold  -         Exercise 4    Exercise Name 4  Ecc Step Downs  -      Reps 4  20  -      Additional Comments  6 inch step  -         Exercise 5    Exercise Name 5  CC resisted 4 way gait  -      Reps 5  10-15 each  -      Additional Comments  45# fwd/retro, 25# lateral  -         Exercise 6    Exercise Name 6  Track walk   -      Reps 6  4 laps  -         Exercise 7    Exercise Name 7  Heel walking/Toe walking alternating in ll bars  -      Reps 7  10 laps total  -         Exercise 8    Exercise Name 8  Shuttle: 2L press  -      Time 8  5 minutes  -      Additional Comments  7 cords  -         Exercise 9    Exercise Name 9  Shuttle 1L Press  -      Time 9  3 minutes   -        User Key  (r) = Recorded By, (t) = Taken By, (c) = Cosigned By    Initials Name Provider Type    Lina Melendrez, PTA Physical Therapy Assistant                       PT OP Goals     Row Name 09/21/21 0800          PT Short Term Goals    STG 1  Patient I with HEP and have additoins/changes by next recertification.  -     STG 1 Progress  Met  [facility]     STG 2  AROM R knee flexion >= 117°  -     STG 2 Progress  Met  [facility]     STG 3  AROM R knee extension 0°.   -     STG 3 Progress  Ongoing;Met  [facility]     STG 4  Patient able to ambulate with good heel to toe gait cycle >= 300', non-antalgicaly, no increase in  pain with terminal knee extension.  -     STG 4 Progress  Met  [facility]     STG 5  Patient able to perform sit to/from stand with 1 UE A = WB B LE x20, no increase in pain.  -     STG 5 Progress  Met  [facility]        Long Term Goals    LTG 1  AROm of the R knee 0°->= 120°.  -     LTG 1 Progress  Ongoing;Met  -     LTG 2  B LE 5/5.  -     LTG 2 Progress  Met  [facility]     LTG 3  Patient able to ascend/descend 3 steps reciprocally with 1 hand rail assist, no increase in pain x10 reps.  -     LTG 3 Progress  Met  -     LTG 4  Patient able to ambulate with no assistive device non-antalgicaly >= 1/4 mile.  -     LTG 4 Progress  Met  [facility]     LTG 5  Patient to be I with final HEP.  -     LTG 5 Progress  Ongoing  -        Time Calculation    PT Goal Re-Cert Due Date  -- N/A  -       User Key  (r) = Recorded By, (t) = Taken By, (c) = Cosigned By    Initials Name Provider Type     Lina Rey PTA Physical Therapy Assistant          Therapy Education  Given: HEP, Symptoms/condition management, Pain management  Program: Reinforced  How Provided: Verbal, Demonstration  Provided to: Patient  Level of Understanding: Teach back education performed, Verbalized, Demonstrated              Time Calculation:   Start Time: 0829  Stop Time: 0932  Time Calculation (min): 63 min  Total Timed Code Minutes- PT: 63 minute(s)  Therapy Charges for Today     Code Description Service Date Service Provider Modifiers Qty    32977933729 HC PT THER PROC EA 15 MIN 9/21/2021 Lina Rey PTA GP 4    67691634286 HC PT THER SUPP EA 15 MIN 9/21/2021 Lina Rey PTA GP 1                    Lina Rey PTA  9/21/2021       This document has been electronically signed by Lina Rey PTA on September 21, 2021 09:36 CDT

## 2021-09-23 ENCOUNTER — HOSPITAL ENCOUNTER (OUTPATIENT)
Dept: PHYSICAL THERAPY | Facility: HOSPITAL | Age: 81
Setting detail: THERAPIES SERIES
Discharge: HOME | End: 2021-09-23

## 2021-09-23 DIAGNOSIS — M25.561 CHRONIC PAIN OF RIGHT KNEE: ICD-10-CM

## 2021-09-23 DIAGNOSIS — G89.29 CHRONIC PAIN OF RIGHT KNEE: ICD-10-CM

## 2021-09-23 DIAGNOSIS — Z96.651 STATUS POST TOTAL RIGHT KNEE REPLACEMENT: Primary | ICD-10-CM

## 2021-09-23 PROCEDURE — 97110 THERAPEUTIC EXERCISES: CPT

## 2021-09-23 NOTE — THERAPY TREATMENT NOTE
Outpatient Physical Therapy Ortho Treatment Note  [facility]     Patient Name: Munir Lemons  : 1940  MRN: [id number]  Today's Date: 2021      Visit Date: 2021     Patient has attended 9 visits  90% Improvement  MD Visit: TBD  Recheck Date: 10-    Therapy Diagnosis: S/P R TKA    Visit Dx:    ICD-10-CM ICD-9-CM   1. Status post total right knee replacement  Z96.651 V43.65   2. Chronic pain of right knee  M25.561 719.46    G89.29 338.29       There is no problem list on file for this patient.       Past Medical History:   Diagnosis Date   • Arthritis    • Cataract     Removed in    • High blood pressure    • History of coronary artery stent placement    • Malfunction of cardiac pacemaker battery 2021   • Pacemaker    • Stroke (CMS/[facility])     TIA        No past surgical history on file.    PT Ortho     Row Name 21       Precautions and Contraindications    Precautions/Limitations  (S) pacemaker  -       Subjective Pain    Able to rate subjective pain?  yes  -    Pre-Treatment Pain Level  0  -       Right Lower Ext    Rt Knee Extension/Flexion AROM  0° of knee extension  -      User Key  (r) = Recorded By, (t) = Taken By, (c) = Cosigned By    Initials Name Provider Type    Lina Melendrez PTA Physical Therapy Assistant                      PT Assessment/Plan     Row Name 21          PT Assessment    Assessment Comments  with effort is able to achieve full knee extension. gives good effort throughout. has good knowledge of all HEP  -        PT Plan    PT Frequency  2x/week  -     PT Plan Comments  if is able to achieve full knee extension next visit will finalize and Discharge at that time.   -       User Key  (r) = Recorded By, (t) = Taken By, (c) = Cosigned By    Initials Name Provider Type    Lina Melendrez PTA Physical Therapy Assistant            OP Exercises     Row Name 21             Precautions     Existing Precautions/Restrictions  (S) pacemaker  -         Subjective Comments    Subjective Comments  states that he is doing well today.   -         Subjective Pain    Able to rate subjective pain?  yes  -      Pre-Treatment Pain Level  0  -      Post-Treatment Pain Level  0  -         Exercise 1    Exercise Name 1  Pro II LE's for ROM, endurance, strength  -      Time 1  10 minutes  -      Additional Comments  L 7.0  -         Exercise 2    Exercise Name 2  B St. HS S  -      Reps 2  2  -MH      Time 2  30 sec hold  -         Exercise 3    Exercise Name 3  B St. Lunge S  -MH      Reps 3  10  -MH      Time 3  10 sec hold  -         Exercise 4    Exercise Name 4  R St. ITB S  -      Reps 4  2  -      Time 4  30 sec hold  -         Exercise 5    Exercise Name 5  Ecc Step Downs  -      Reps 5  20  -      Additional Comments  6 inch step  -         Exercise 6    Exercise Name 6  Wall Sits  -      Sets 6  2  -      Reps 6  10  -      Time 6  5 sec hold  -         Exercise 7    Exercise Name 7  Shuttle 2L  -      Time 7  5 minutes  -      Additional Comments  8 cords  -         Exercise 8    Exercise Name 8  Shuttle 1L   -      Time 8  3 minutes  -      Additional Comments  6 cords   -         Exercise 9    Exercise Name 9  Heel Walking in Pbars  -      Time 9  3 minutes  -         Exercise 10    Exercise Name 10  Quad sets  -      Reps 10  20  -      Time 10  5 sec hold  -         Exercise 11    Exercise Name 11  Prone Hang  -      Time 11  5 minutes  -      Additional Comments  4#  -        User Key  (r) = Recorded By, (t) = Taken By, (c) = Cosigned By    Initials Name Provider Type     Lina Rey, PTA Physical Therapy Assistant                       PT OP Goals     Row Name 09/23/21 0900          PT Short Term Goals    STG 1  Patient I with HEP and have additoins/changes by next recertification.  -     STG 1 Progress  Met  -     STG 2   AROM R knee flexion >= 117°  -     STG 2 Progress  Met  -     STG 3  AROM R knee extension 0°.   -     STG 3 Progress  Ongoing;Met  -     STG 4  Patient able to ambulate with good heel to toe gait cycle >= 300', non-antalgicaly, no increase in pain with terminal knee extension.  -     STG 4 Progress  Met  [facility]     STG 5  Patient able to perform sit to/from stand with 1 UE A = WB B LE x20, no increase in pain.  -     STG 5 Progress  Met  [facility]        Long Term Goals    LTG 1  AROm of the R knee 0°->= 120°.  -     LTG 1 Progress  Ongoing;Met  [facility]     LTG 2  B LE 5/5.  -     LTG 2 Progress  Met  [facility]     LTG 3  Patient able to ascend/descend 3 steps reciprocally with 1 hand rail assist, no increase in pain x10 reps.  -     LTG 3 Progress  Met  [facility]     LTG 4  Patient able to ambulate with no assistive device non-antalgicaly >= 1/4 mile.  -     LTG 4 Progress  Met  [facility]     LTG 5  Patient to be I with final HEP.  -     LTG 5 Progress  Ongoing  -        Time Calculation    PT Goal Re-Cert Due Date  -- N/A  -       User Key  (r) = Recorded By, (t) = Taken By, (c) = Cosigned By    Initials Name Provider Type     Lina Rey PTA Physical Therapy Assistant          Therapy Education  Given: HEP, Symptoms/condition management, Pain management  Program: Reinforced  How Provided: Verbal, Demonstration  Provided to: Patient  Level of Understanding: Teach back education performed, Verbalized, Demonstrated              Time Calculation:   Start Time: 0915  Stop Time: 1009  Time Calculation (min): 54 min  Total Timed Code Minutes- PT: 54 minute(s)  Therapy Charges for Today     Code Description Service Date Service Provider Modifiers Qty    28939280397 HC PT THER PROC EA 15 MIN 9/23/2021 iLna Rey PTA GP 4    31588496954 HC PT THER SUPP EA 15 MIN 9/23/2021 Lina Rey PTA GP 1                    Lina Rey PTA  9/23/2021       This document has been electronically signed by Lina Rey  PTA on September 23, 2021 10:12 CDT

## 2021-09-28 ENCOUNTER — APPOINTMENT (OUTPATIENT)
Dept: PHYSICAL THERAPY | Facility: HOSPITAL | Age: 81
End: 2021-09-28

## 2021-09-30 ENCOUNTER — HOSPITAL ENCOUNTER (OUTPATIENT)
Dept: PHYSICAL THERAPY | Facility: HOSPITAL | Age: 81
Setting detail: THERAPIES SERIES
Discharge: HOME | End: 2021-09-30

## 2021-09-30 DIAGNOSIS — Z96.651 STATUS POST TOTAL RIGHT KNEE REPLACEMENT: Primary | ICD-10-CM

## 2021-09-30 DIAGNOSIS — G89.29 CHRONIC PAIN OF RIGHT KNEE: ICD-10-CM

## 2021-09-30 DIAGNOSIS — M25.561 CHRONIC PAIN OF RIGHT KNEE: ICD-10-CM

## 2021-09-30 PROCEDURE — 97110 THERAPEUTIC EXERCISES: CPT | Performed by: PHYSICAL THERAPIST

## 2021-10-05 ENCOUNTER — APPOINTMENT (OUTPATIENT)
Dept: PHYSICAL THERAPY | Facility: HOSPITAL | Age: 81
End: 2021-10-05

## 2021-10-07 ENCOUNTER — APPOINTMENT (OUTPATIENT)
Dept: PHYSICAL THERAPY | Facility: HOSPITAL | Age: 81
End: 2021-10-07